# Patient Record
Sex: MALE | Race: WHITE | NOT HISPANIC OR LATINO | URBAN - METROPOLITAN AREA
[De-identification: names, ages, dates, MRNs, and addresses within clinical notes are randomized per-mention and may not be internally consistent; named-entity substitution may affect disease eponyms.]

---

## 2023-03-29 ENCOUNTER — EMERGENCY (EMERGENCY)
Facility: HOSPITAL | Age: 23
LOS: 1 days | Discharge: HOME | End: 2023-03-29
Attending: STUDENT IN AN ORGANIZED HEALTH CARE EDUCATION/TRAINING PROGRAM
Payer: MEDICAID

## 2023-03-29 PROCEDURE — 99284 EMERGENCY DEPT VISIT MOD MDM: CPT

## 2023-03-29 PROCEDURE — 99282 EMERGENCY DEPT VISIT SF MDM: CPT

## 2023-03-29 NOTE — ED ADULT TRIAGE NOTE - CAS ELECT INFOMATION PROVIDED
MARLON Aviles  not  involved  in  the  care  of  this  pt only  backloading  from downtime/DC instructions

## 2023-03-31 NOTE — DOWNTIME INTERRUPTION NOTE - WHICH MANUAL FORMS INITIATED?
Jose Cruz Ashley, PGY-3, MD: I was not involved in the care of this patient and am only entering information to back log for downtime. Regarding Emergency Department care during this visit/admission, please see paper chart for isolation and medical management details, as the mandatory fields in the disposition section may not be accurate.

## 2024-12-26 ENCOUNTER — EMERGENCY (EMERGENCY)
Facility: HOSPITAL | Age: 24
LOS: 1 days | Discharge: HOME | End: 2024-12-26
Attending: STUDENT IN AN ORGANIZED HEALTH CARE EDUCATION/TRAINING PROGRAM
Payer: SELF-PAY

## 2024-12-26 VITALS
DIASTOLIC BLOOD PRESSURE: 68 MMHG | RESPIRATION RATE: 16 BRPM | OXYGEN SATURATION: 99 % | HEART RATE: 60 BPM | HEIGHT: 67 IN | SYSTOLIC BLOOD PRESSURE: 128 MMHG | TEMPERATURE: 98 F | WEIGHT: 171.3 LBS

## 2024-12-26 PROCEDURE — 99283 EMERGENCY DEPT VISIT LOW MDM: CPT | Mod: 25

## 2024-12-26 PROCEDURE — 73030 X-RAY EXAM OF SHOULDER: CPT | Mod: 26,LT

## 2024-12-26 PROCEDURE — 99284 EMERGENCY DEPT VISIT MOD MDM: CPT

## 2024-12-26 PROCEDURE — 73030 X-RAY EXAM OF SHOULDER: CPT

## 2024-12-26 RX ORDER — IBUPROFEN 200 MG
600 TABLET ORAL ONCE
Refills: 0 | Status: COMPLETED | OUTPATIENT
Start: 2024-12-26 | End: 2024-12-26

## 2024-12-26 NOTE — ED PROVIDER NOTE - PATIENT PORTAL LINK FT
You can access the FollowMyHealth Patient Portal offered by [facility] by registering at the following website: [email or web address]. By joining [email or web address]’s FollowMyHealth portal, you will also be able to view your health information using other applications (apps) compatible with our system.

## 2024-12-26 NOTE — ED PROVIDER NOTE - NSFOLLOWUPINSTRUCTIONS_ED_ALL_ED_FT
You have been evaluated in the Emergency Department today for shoulder pain. Your evaluation did not find evidence of medical conditions requiring emergent intervention at this time.    We recommend you take 600mg ibuprofen every 6 hours or Tylenol 650mg every 6 hours as needed for pain. If needed, you can alternate these medications so that you take one medication every 3 hours. For instance, at noon take ibuprofen, then at 3pm take Tylenol, then at 6pm take ibuprofen.    Please schedule an appointment for follow up with your primary care physician this week.    Return to the Emergency Department if you experience worsening pain, numbness, tingling, change of color in your toes, or any other concerning symptoms.    Thank you for choosing us for your care.

## 2024-12-26 NOTE — ED PROVIDER NOTE - OBJECTIVE STATEMENT
24 male presents for left shoulder pain for 1 month. 24 male presents for left shoulder pain for 1 month. 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 Reports intermittent sharp pain in left shoulder.     GENERAL APPEARANCE:  AAOx3, generally well-appearing, no acute distress. Appears stated age.  HEENT:  NCAT. Moist mucous membranes. EOMI, clear conjunctiva, no slceral icterus, oropharynx clear.  NECK:  Supple without lymphadenopathy. No JVD.  No neck stiffness or restricted ROM.  HEART:  Normal heart rate and regular rhythm. No murmur.   LUNGS:  CTAB, moving air well. No crackles or wheezes are heard.  ABDOMEN:  Soft, nontender, non-distended. Negative La. Negative McBurney. No rebound or guarding.  CHEST/BACK: Chest wall non-tender. No CVAT, or midline cervical/thoracic/lumbar tenderness to palpation. No obvious deformity of chest wall or back.  EXTREMITIES:  Without cyanosis, clubbing or edema. Pulse intact x 4. FROM x4. Compartments soft in all extremities.  NEUROLOGICAL:  Grossly non-focal. Alert and oriented, moving all 4 extremities. CN II-XII grossly intact. Observed to ambulate with normal gait.  SKIN:  Warm and dry without any rash.  PSYCH: Calm, cooperative. Normal mood and affect. Demonstrates proper insight and judgement.

## 2024-12-26 NOTE — ED PROVIDER NOTE - CLINICAL SUMMARY MEDICAL DECISION MAKING FREE TEXT BOX
Initial consideration in this patient included herniated intervertebral disc, acute ligamentous injury, acute muscle strain, spondylolisthesis and other musculoskeletal etiologies, cauda equina, spinal fracture, spinal stenosis, epidural abscess and hematoma, cancer metastases, and kidney stone among others.    H&P consistent with muscle strain. Prior to discharge, we discussed modified activity with emphasis on avoiding prolonged bedrest.  We discussed return precautions, specifically for worsening pain or focal neurologic deficits, treatment with [NSAIDs/muscle relaxants/etc.], and follow up with primary care doctor within one week for further evaluation, and the patient demonstrated understanding and agreement with this plan.  We specifically discussed follow up with primary care doctor for referral to physical therapy and consideration of outpatient imaging.

## 2024-12-27 RX ORDER — CYCLOBENZAPRINE HCL 10 MG
1 TABLET ORAL
Qty: 14 | Refills: 0
Start: 2024-12-27 | End: 2025-01-09

## 2024-12-27 RX ORDER — IBUPROFEN 200 MG
1 TABLET ORAL
Qty: 42 | Refills: 0
Start: 2024-12-27 | End: 2025-01-09

## 2024-12-27 NOTE — ED ADULT NURSE NOTE - NURSING MUSC EXTREMITY NORMAL ROM
right upper extremity/left lower extremity/right lower extremity
acceptable level 2/3/numerical 0-10